# Patient Record
(demographics unavailable — no encounter records)

---

## 2025-07-02 NOTE — PHYSICAL EXAM
[General Appearance - Alert] : alert [General Appearance - In No Acute Distress] : in no acute distress [General Appearance - Well Nourished] : well nourished [General Appearance - Well Developed] : well developed [FreeTextEntry1] : mildly anxious appearing [Naming Objects] : no difficulty naming common objects [Repeating Phrases] : no difficulty repeating a phrase [Fluency] : fluency intact [Comprehension] : comprehension intact [Cranial Nerves Optic (II)] : visual acuity intact bilaterally,  visual fields full to confrontation, pupils equal round and reactive to light [Cranial Nerves Oculomotor (III)] : extraocular motion intact [Cranial Nerves Trigeminal (V)] : facial sensation intact symmetrically [Cranial Nerves Facial (VII)] : face symmetrical [Cranial Nerves Hypoglossal (XII)] : there was no tongue deviation with protrusion [Finger Rub Not [place]] : finger rub was heard [Motor Tone] : muscle tone was normal in all four extremities [Motor Strength] : muscle strength was normal in all four extremities [Sensation Tactile Decrease] : light touch was intact [Coordination - Dysmetria Impaired Finger-to-Nose Bilateral] : not present [MocaTotal] : 10
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
## 2025-07-02 NOTE — DISCUSSION/SUMMARY
[FreeTextEntry1] : Pt is a 65 yo with PMHx of OA , hypothyroidism, s/p thyroidectomy 2/2 malignancy, HLD, who presents with her daughter for f/u.  # Cognitive impairment: initial assessment, given untreated dementia/anxiety was concenring for possible pseudodementia. However now patient has shown consistent decline over the last few years, thus more concerning for an underlying neurodegenerative process such as AD vs Lewy body. MoCA 10/30 today, was MoCA 13/30 in 2024 and 16/30 in 2023. Some cooperation limitation with testing. MRI brain without contrast evidence of significant atrophy, WMD (mild), or lesions; overall stable from 2022.  - Start memantine 5mg daily - PET scan dementia - Quetiapine 12.5mg QHS prn (may increase to 25mg pending response) - Continue f/u with Endocrinology for hypothyroidism - Discussed increasing physical exercise and doing brain games/puzzles, to help optimize memory/cognition   RTC in 6mo.